# Patient Record
Sex: MALE | URBAN - METROPOLITAN AREA
[De-identification: names, ages, dates, MRNs, and addresses within clinical notes are randomized per-mention and may not be internally consistent; named-entity substitution may affect disease eponyms.]

---

## 2018-07-23 ENCOUNTER — IMPORTED ENCOUNTER (OUTPATIENT)
Dept: URBAN - METROPOLITAN AREA CLINIC 1 | Facility: CLINIC | Age: 58
End: 2018-07-23

## 2018-07-23 PROBLEM — Z79.84: Noted: 2018-07-23

## 2018-07-23 PROBLEM — E11.9: Noted: 2018-07-23

## 2018-07-23 PROBLEM — H40.023: Noted: 2018-07-23

## 2018-07-23 PROBLEM — H40.033: Noted: 2018-07-23

## 2018-07-23 PROBLEM — H25.813: Noted: 2018-07-23

## 2018-07-23 PROCEDURE — 92250 FUNDUS PHOTOGRAPHY W/I&R: CPT

## 2018-07-23 PROCEDURE — 92015 DETERMINE REFRACTIVE STATE: CPT

## 2018-07-23 PROCEDURE — 92004 COMPRE OPH EXAM NEW PT 1/>: CPT

## 2018-07-23 NOTE — PATIENT DISCUSSION
1.  DM Type II without sign of diabetic retinopathy and no blot heme on dilated retinal examination today OU No Macular Edema:  Discussed the pathophysiology of diabetes and its effect on the eye and risk of blindness. Stressed the importance of strong glucose control. Advised of importance of at least yearly dilated examinations but to contact us immediately for any problems or concerns. 2. Type II diabetes controlled by oral medications. 3.  Narrow Angles OU - Not Occludible4. Glaucoma Suspect OU : (.8 OU) Patient is considered high risk. Negative FHX. Condition was discussed with patient and patient understands. Will continue to monitor patient for any progression in condition. Patient was advised to call us with any problems questions or concerns. Patient to return for baseline w/u. Disc photos obtained today. Patient to have old records sent over. 5. Cataract OU: Observe for now without intervention. The patient was advised to contact us if any change or worsening of vision6. Return for an appointment for w/in 3mo/10 OCT HVF with Dr. Lucien Santoyo.

## 2018-07-23 NOTE — PATIENT DISCUSSION
Glaucoma Suspect OU : (.8 OU) Patient is considered high risk. Negative FHX. Condition was discussed with patient and patient understands. Will continue to monitor patient for any progression in condition. Patient was advised to call us with any problems questions or concerns. Patient to return for baseline w/u. Disc photos obtained today. Patient to have old records sent over.

## 2018-09-24 ENCOUNTER — IMPORTED ENCOUNTER (OUTPATIENT)
Dept: URBAN - METROPOLITAN AREA CLINIC 1 | Facility: CLINIC | Age: 58
End: 2018-09-24

## 2018-09-24 PROBLEM — H40.013: Noted: 2018-09-24

## 2018-09-24 PROCEDURE — 92083 EXTENDED VISUAL FIELD XM: CPT

## 2018-09-24 PROCEDURE — 99213 OFFICE O/P EST LOW 20 MIN: CPT

## 2018-09-24 PROCEDURE — 92133 CPTRZD OPH DX IMG PST SGM ON: CPT

## 2018-09-24 NOTE — PATIENT DISCUSSION
Narrow Angles OU - Not Occludible3. Cataract OU: Observe4. H/o DM w/o DR Nini Pulliam for an appointment in August 30/OCT with Dr. Marcella Rizo.

## 2018-09-24 NOTE — PATIENT DISCUSSION
1.  Glaucoma Suspect OU (CD 0.80 OU): OCT shows minimal thinning OD WNL OS. HVF WNL OU. Negative family hx. Patient is considered Low Risk. Condition was discussed with patient and patient understands. Will continue to monitor patient for any progression in condition. Patient was advised to call us with any problems questions or concerns. 2.  Narrow Angles OU - Not Occludible3. Cataract OU: Observe4. Blepharitis OU - warm compresses and lid scrubs 5. H/o DM w/o DR Erasmo Rivera for an appointment in August 30/OCT with Dr. Zuhair Sofia.

## 2022-04-02 ASSESSMENT — VISUAL ACUITY
OD_SC: 20/20
OD_CC: 20/25
OS_CC: 20/30-2
OS_SC: 20/20

## 2022-04-02 ASSESSMENT — TONOMETRY
OS_IOP_MMHG: 16
OD_IOP_MMHG: 17
OD_IOP_MMHG: 16
OS_IOP_MMHG: 18

## 2022-08-25 ENCOUNTER — NEW PATIENT (OUTPATIENT)
Dept: URBAN - METROPOLITAN AREA CLINIC 1 | Facility: CLINIC | Age: 62
End: 2022-08-25

## 2022-08-25 DIAGNOSIS — E11.9: ICD-10-CM

## 2022-08-25 DIAGNOSIS — H40.033: ICD-10-CM

## 2022-08-25 DIAGNOSIS — H40.013: ICD-10-CM

## 2022-08-25 DIAGNOSIS — H25.813: ICD-10-CM

## 2022-08-25 PROCEDURE — 92004 COMPRE OPH EXAM NEW PT 1/>: CPT

## 2022-08-25 PROCEDURE — 92015 DETERMINE REFRACTIVE STATE: CPT

## 2022-08-25 PROCEDURE — 92133 CPTRZD OPH DX IMG PST SGM ON: CPT

## 2022-08-25 ASSESSMENT — VISUAL ACUITY
OD_CC: 20/20
OS_CC: J1+
OD_CC: J1+
OD_BAT: 20/30
OS_CC: 20/20
OS_BAT: 20/30

## 2022-08-25 ASSESSMENT — KERATOMETRY
OS_K2POWER_DIOPTERS: 42.25
OD_AXISANGLE2_DEGREES: 25
OS_AXISANGLE_DEGREES: 081
OS_K1POWER_DIOPTERS: 42.00
OD_K1POWER_DIOPTERS: 41.75
OD_K2POWER_DIOPTERS: 42.25
OS_AXISANGLE2_DEGREES: 171
OD_AXISANGLE_DEGREES: 115

## 2022-08-25 ASSESSMENT — TONOMETRY
OS_IOP_MMHG: 18
OD_IOP_MMHG: 18

## 2022-08-25 NOTE — PATIENT DISCUSSION
(CD .80 OU) OCT Shows Mild Sup. Thinning OD, WNL OS (Stable)  IOP 18 OU Patient is considered low risk. Condition was discussed with patient and patient understands. Will continue to monitor patient for any progression in condition. Patient was advised to call us with any problems, questions, or concerns. Patient will return in 1 year for 30/HVF.